# Patient Record
(demographics unavailable — no encounter records)

---

## 2025-03-04 NOTE — PROCEDURE
[de-identified] : Procedure Name: Large Joint Injection / Aspiration: Depomedrol and Marcaine Anesthesia: ethyl chloride sprayed topically..  Depomedrol: An injection of Depomedrol 80 mg , 1 cc.  Marcaine: 5 cc.  Medication was injected in bilateral knees. Patient has tried OTC's including aspirin, Ibuprofen, Aleve etc or prescriptionNSAIDS, and/or exercises at home and/ or physical therapy without satisfactory response. After verbal consent using sterile preparation and technique. The risks, benefits, and alternatives to cortisone injection were explained in full to the patient. Risks outlined include but are not limited to infection, sepsis, bleeding, scarring, skin discoloration, temporary  increase in pain, syncopal episode, failure to resolve symptoms, allergic reaction, symptom recurrence, and elevation of blood sugar in diabetics. Patient understood the risks. All questions were answered. After discussion of options, patient requested an injection. Oral informed consent was obtained and sterile prep was done of the injection site. Sterile technique was utilized for the procedure including the preparation of the solutions used for the injection. Patient tolerated the procedure well. Advised to ice the injection site this evening. Prep with alcohol locally to site. Sterile technique used. Post Procedure Instructions: Patient was advised to call if redness, pain, or fever occur and apply ice for 15 min. out of every hour for the next 12-24 hours as tolerated.

## 2025-03-04 NOTE — HISTORY OF PRESENT ILLNESS
[Left Leg] : left leg [Right Leg] : right leg [Gradual] : gradual [3] : 3 [Intermittent] : intermittent [Household chores] : household chores [Sleep] : sleep [Rest] : rest [Injection therapy] : injection therapy [Standing] : standing [Walking] : walking [de-identified] : 11/18/22 PT PRESENTS HERE TODAY WITH BL KNEES PAIN FOR YEARS  PT HAD BL KNEES CORTISONE BACK IN 2015 BY DR FRIEND WITH GOOD RELIEF   3.4.25 PATIENT HERE FOR BILATERAL KNEE PAIN. SEEN IN 2022 WITH BILATERAL CORTISONE WITH GOOD RELIEF. VOLTAREN GEL PRN. PAIN SEVERE AT TIMES. WORSE WITH STAIRS AND GETTING UP FROM SEATED POSITION  PMHX NEPHRECTOMY, CHRONIC PAIN MED USE/BACK PAIN  [] : no [FreeTextEntry1] : KNEES [FreeTextEntry5] : NO INJURY  [FreeTextEntry9] : CORTISONE INJECITONS  [de-identified] : CORTISONE INJECTIONS

## 2025-03-04 NOTE — IMAGING
[Bilateral] : knee bilaterally [All Views] : anteroposterior, lateral, skyline, and anteroposterior standing [Moderate tricompartmental OA medial narrowing] : Moderate tricompartmental OA medial narrowing [Advanced patellofemoral OA] : Advanced patellofemoral OA

## 2025-03-04 NOTE — PHYSICAL EXAM
[Bilateral] : knee bilaterally [NL (140)] : flexion 140 degrees [NL (0)] : extension 0 degrees [] : no effusion

## 2025-03-04 NOTE — ASSESSMENT
[FreeTextEntry1] : 53 year F WITH MODERATE B/L KNEE PAIN. PAIN WORSENS WITH STAIRS AND WALKING PROLONGED DISTANCES. PAIN IS AFFECTING ADL AND FUNCTIONAL ACTIVITIES. XRAYS REVIEWED WITH ADVANCED OA. TREATMENT OPTIONS REVIEWED. [facility].   CHRONIC NECK AND BACK PAIN. HAS A LUMBAR STIMULATOR TAKING DILAUDID AND MORPHINE.   WE DISCUSSED TREATEMENTS AND EVENTUAL TKA. WILL GIVE BILATERAL KNEE CORTISONE TODAY. TIMING DISCUSSED WE ALSO DICUSSED ADDING NSAID, SHE HAS ONE KIDNEY. THE RISK DOES NOT OUTWEIGH BENEFITS.

## 2025-06-03 NOTE — DISCUSSION/SUMMARY
[de-identified] : I, Candy Brito, am scribing for Dr. Lenz in his presence for the chief complaint, physical exam, studies, assessment, and/or plan.

## 2025-06-03 NOTE — PROCEDURE
[de-identified] : Procedure Name: Large Joint Injection / Aspiration: Depomedrol and Marcaine Anesthesia: ethyl chloride sprayed topically..  Depomedrol: An injection of Depomedrol 80 mg , 1 cc.  Marcaine: 5 cc.  Medication was injected in bilateral knees. Patient has tried OTC's including aspirin, Ibuprofen, Aleve etc or prescriptionNSAIDS, and/or exercises at home and/ or physical therapy without satisfactory response. After verbal consent using sterile preparation and technique. The risks, benefits, and alternatives to cortisone injection were explained in full to the patient. Risks outlined include but are not limited to infection, sepsis, bleeding, scarring, skin discoloration, temporary  increase in pain, syncopal episode, failure to resolve symptoms, allergic reaction, symptom recurrence, and elevation of blood sugar in diabetics. Patient understood the risks. All questions were answered. After discussion of options, patient requested an injection. Oral informed consent was obtained and sterile prep was done of the injection site. Sterile technique was utilized for the procedure including the preparation of the solutions used for the injection. Patient tolerated the procedure well. Advised to ice the injection site this evening. Prep with alcohol locally to site. Sterile technique used. Post Procedure Instructions: Patient was advised to call if redness, pain, or fever occur and apply ice for 15 min. out of every hour for the next 12-24 hours as tolerated.

## 2025-06-03 NOTE — ASSESSMENT
[FreeTextEntry1] : 55 year F WITH MODERATE B/L KNEE PAIN. PAIN WORSENS WITH STAIRS AND WALKING PROLONGED DISTANCES. PAIN IS AFFECTING ADL AND FUNCTIONAL ACTIVITIES. XRAYS REVIEWED WITH ADVANCED OA. TREATMENT OPTIONS REVIEWED. WE DISCUSSED TREATEMENTS AND EVENTUAL TKA.   CHRONIC NECK AND BACK PAIN. HAS A LUMBAR STIMULATOR TAKING DILAUDID AND MORPHINE.   DISCUSSED TAKING ANTI-INFLAMMATORIES AND MEDICATION MANAGEMENT. BECAUSE THE PATIENT HAS H/O NEPHRECTOMY, I RECOMMENDED NOT TAKING ANTI-INFLAMMATORIES BUT WILL CONTINUE WITH TYLENOL AS NEEDED FOR PAIN.  B/L KNEE CSI TODAY. PATIENT TOLERATED INJECTION WELL.

## 2025-06-03 NOTE — HISTORY OF PRESENT ILLNESS
[Left Leg] : left leg [Right Leg] : right leg [Gradual] : gradual [5] : 5 [Intermittent] : intermittent [Household chores] : household chores [Sleep] : sleep [Rest] : rest [Injection therapy] : injection therapy [Standing] : standing [Walking] : walking [de-identified] : 11/18/22 PT PRESENTS HERE TODAY WITH BL KNEES PAIN FOR YEARS  PT HAD BL KNEES CORTISONE BACK IN 2015 BY DR FRIEND WITH GOOD RELIEF   3.4.25 PATIENT HERE FOR BILATERAL KNEE PAIN. SEEN IN 2022 WITH BILATERAL CORTISONE WITH GOOD RELIEF. VOLTAREN GEL PRN. PAIN SEVERE AT TIMES. WORSE WITH STAIRS AND GETTING UP FROM SEATED POSITION  PMHX NEPHRECTOMY, CHRONIC PAIN MED USE/BACK PAIN  6.3.25 PATIENT HERE FOR BILATERAL KNEE PAIN. [] : no [FreeTextEntry1] : KNEES [FreeTextEntry5] : NO INJURY  [FreeTextEntry9] : CORTISONE INJECITONS  [de-identified] : CORTISONE INJECTIONS

## 2025-07-29 NOTE — ASSESSMENT
[FreeTextEntry1] : 55 year F WITH MODERATE B/L KNEE PAIN, L>R. PAIN WORSENS WITH STAIRS AND WALKING PROLONGED DISTANCES. PAIN IS AFFECTING ADL AND FUNCTIONAL ACTIVITIES. XRAYS REVIEWED WITH ADVANCED OA. TREATMENT OPTIONS REVIEWED. QUESTIONS ANSWERED. LT TKA DISCUSSED AT LENGTH.   CHRONIC NECK AND BACK PAIN. HAS A LUMBAR STIMULATOR TAKING DILAUDID AND MORPHINE FOR YEARS.  DISCUSSED TAKING ANTI-INFLAMMATORIES AND MEDICATION MANAGEMENT. BECAUSE THE PATIENT HAS H/O NEPHRECTOMY, I RECOMMENDED NOT TAKING ANTI-INFLAMMATORIES BUT WILL CONTINUE WITH TYLENOL AS NEEDED FOR PAIN.  PMHx: CHRONIC NARCOTIC USE FOR LUMBAR ISSUES, H/O NEPHRECTOMY,  NO METAL ALLERGIES NO H/O DM NO H/O DVT/PE NO H/O MRSA/VRSA  LT TKA -   DISCUSSED R&B OF TKA. WILL ORDER CT TO EVAL FOR BONE LOSS AND DEFORMITY FOR PRE-OP PLANNING.   The patient was advised of the diagnosis. The natural history of the pathology was explained in full to the patient in layman's terms. All questions were answered. The risks and benefits of surgical and non-surgical treatment alternatives were explained in full to the patient.   We discussed my findings and the natural history of their condition. We talked about the details of the proposed surgery and the recovery. We discussed the material risks, possible benefits and alternatives to surgery. The risks include but are not limited to infection, bleeding and possible need for blood transfusion, fracture, bowel blockage, bladder retention or infection, need for reoperation, stiffness and/or limited range of motion, possible damage to nerves and blood vessels, failure of fixation of components, risk of deep vein thromboses and pulmonary embolism, wound healing problems. Although incredibly rare, we also discussed the risks of a cardiac event, stroke and even death during, or following, the surgery. We discussed the type of implants the patient will be receiving and the type of fixation that will be used, as well as whether a robot or computer navigation aide will be used. The patient understands they will need medical clearance and will attend a preoperative joint education class. We also discussed the type of anesthesia they will receive, and the risks associated with hospital or rehab length of stay, obesity, diabetes and smoking.   Patient Complains of pain in the affected joint with a level that often reaches greater than a 8/10. The pain has been progressively worsening throughout his/her treatment course. The pain has interfered with their ADLs and worsens with weight bearing. On exam they often have episodes of swelling/effusion with limited ROM. Pain worsens with ROM passive and active and I can palpate crepitus.   X- rays were reviewed with the patient and they show joint space narrowing, subchondral sclerosis, osteophyte formation, and subchondral cysts. After a period of more than 12 weeks physical therapy or exercise program done with me or another treating physician they have continued pain. The patient has failed a trial of NSAID medication or pain relievers if they were unable to tolerate NSAID medications as well as a series of injections, steroids, or hyaluronic acid. After a long discussion with the patient both the patient and I have decided we have exhausted all forms of less radical treatments and they would like to proceed with Total Joint Replacement.   Patient will plan to schedule surgery. Patient requires rolling walker and 3-in-1 commode S/P surgery. Patient currently as limited mobility that significantly impairs their ability to participate in one or more mobility related activities of daily living in the home. The patient is able to safely use the walker and the functional mobility deficit can be sufficiently resolved with the use of a walker. Patient will also be confined to one level of the home environment and there is no toilet on that level. Requires 3-in-1 commode for bedside use as patient cannot access bathroom safely in their home in timely manner. Will order rolling walker and 3-in-1 commode.

## 2025-07-29 NOTE — DISCUSSION/SUMMARY
[de-identified] : I, Candy Brito, am scribing for Dr. Lenz in his presence for the chief complaint, physical exam, studies, assessment, and/or plan.

## 2025-07-29 NOTE — HISTORY OF PRESENT ILLNESS
[Left Leg] : left leg [Right Leg] : right leg [Gradual] : gradual [5] : 5 [Intermittent] : intermittent [Household chores] : household chores [Sleep] : sleep [Rest] : rest [Injection therapy] : injection therapy [Standing] : standing [Walking] : walking [de-identified] : 11/18/22 PT PRESENTS HERE TODAY WITH BL KNEES PAIN FOR YEARS  PT HAD BL KNEES CORTISONE BACK IN 2015 BY DR FRIEND WITH GOOD RELIEF   3.4.25 PATIENT HERE FOR BILATERAL KNEE PAIN. SEEN IN 2022 WITH BILATERAL CORTISONE WITH GOOD RELIEF. VOLTAREN GEL PRN. PAIN SEVERE AT TIMES. WORSE WITH STAIRS AND GETTING UP FROM SEATED POSITION  PMHX NEPHRECTOMY, CHRONIC PAIN MED USE/BACK PAIN  6.3.25 PATIENT HERE FOR BILATERAL KNEE PAIN.  7.29.25: PATIENT HERE FOR F/U BILATERAL KNEE PAIN.  PAIN COME AND GOES BUT THE LEFT KNEE IS THE WORSE.  [] : no [FreeTextEntry1] : KNEES [FreeTextEntry5] : NO INJURY  [FreeTextEntry9] : CORTISONE INJECITONS  [de-identified] : CORTISONE INJECTIONS